# Patient Record
Sex: FEMALE | Race: WHITE | NOT HISPANIC OR LATINO | Employment: OTHER | URBAN - METROPOLITAN AREA
[De-identification: names, ages, dates, MRNs, and addresses within clinical notes are randomized per-mention and may not be internally consistent; named-entity substitution may affect disease eponyms.]

---

## 2017-10-18 NOTE — PATIENT DISCUSSION
this eye was NOT a toric IOL was a paired 30 degrees at 073.  residual cyl will likely need to be addressed to allow patient BCVA OS.  See [facility] in 2-3 weeks to consider YAG and possible LVC after healing finished (goal OS -0.50).   Pt ed [facility] will likely not want to LVC until at least 6 weeks PO.

## 2017-11-29 NOTE — PATIENT DISCUSSION
this eye was NOT a toric IOL was a paired 30 degrees at 073.  residual cyl will likely need to be addressed to allow patient BCVA OS.  See [facility] in 3-4 weeks to perform YAG and reasses for possible LVC after healing finished (goal OS -0.50).   Pt ed [facility] will likely not want to LVC until at least 6 weeks PO.

## 2017-11-29 NOTE — PROCEDURE NOTE: SURGICAL
Prior to commencing surgery patient identification, surgical procedure, site, and side were confirmed by Dr. Esteban Jarvis. Following topical proparacaine anesthesia, the patient was positioned at the YAG laser, a contact lens coupled to the cornea with methylcellulose and an axial posterior capsulotomy performed without complication using 2.9 Mj x 28. Excess methylcellulose was washed from the eye, one drop of Alphagan was instilled and the patient returned to the holding area having tolerated the procedure well and without complication. <br />

## 2017-12-07 NOTE — PROCEDURE NOTE: SURGICAL
Prior to commencing surgery patient identification, surgical procedure, site, and side were confirmed by Dr. Stephanie Torrez. Following topical proparacaine anesthesia, the patient was positioned at the YAG laser, a contact lens coupled to the cornea with methylcellulose and an axial posterior capsulotomy performed without complication using 3.1 Mj x 21. Excess methylcellulose was washed from the eye, one drop of Alphagan was instilled and the patient returned to the holding area having tolerated the procedure well and without complication. <br />

## 2017-12-14 NOTE — PATIENT DISCUSSION
OD excellent.  OS needs LVC to -0.50 goal.  full ed RBA to LVC enh OS.  To [facility] to decide i-LASIK vs epi OS.

## 2018-01-26 NOTE — PATIENT DISCUSSION
try Ilevro once a day for 2-3 weeks in LEFT eye to try and help with light sensitivity .  samples given.

## 2018-01-26 NOTE — PATIENT DISCUSSION
ed cease cross-covering eyes are exactly as supposed to be at this point.  Surgically everything looks perfect.

## 2018-02-07 NOTE — PATIENT DISCUSSION
this eye was NOT a toric IOL was a paired 30 degrees at 073.  residual cyl will likely need to be addressed to allow patient BCVA OS.  See [facility] in 3-4 weeks to perform YAG and reasses for possible LVC after healing finished (goal OS -0.50).   Pt ed [facility] will likely not want to LVC until at least 6 weeks PO. Statement Selected

## 2018-07-18 NOTE — PATIENT DISCUSSION
today I gave pt Rx for DVO to enhance BILATERAL DVA to max potential as he has numerous complaints about his DVA and I think he needs max correction with glare reduction OU to have a chance to see the way he wishes to see at distance (slight offset is bothering him and minor cyl OD is subjectively an issue even though entrance acuity OD is a weaker 20/20).

## 2018-07-18 NOTE — PATIENT DISCUSSION
will talk to [facility] to review data to see if we want to entertain the thought of LVC OD.  Pt feels DVA not as clear as he would like (street signs and seeing features on people's face at the gym etc).   Notes VA is variable thru the day so will try to keep eyes lubricated to see if this helps.  will order CTL for possible simulation for additional dist correction simulation OD.

## 2018-08-10 NOTE — PATIENT DISCUSSION
last visit I gave pt Rx for DVO to enhance BILATERAL DVA to max potential as he has numerous complaints about his DVA and I think he needs max correction with glare reduction OU to have a chance to see the way he wishes to see at distance (slight offset is bothering him and minor cyl OD is subjectively an issue even though entrance acuity OD is a weaker 20/20).

## 2018-08-10 NOTE — PATIENT DISCUSSION
will send for to talk to Yane Salgado to review data to see if we want to entertain the thought of LVC OD but Rx is very mild.  Pt feels DVA not as clear as he would like (street signs and seeing features on people's face at the gym etc).   Notes VA is variable thru the day so will try to keep eyes lubricated to see if this helps.

## 2018-08-10 NOTE — PATIENT DISCUSSION
"pt says his vision is ""not perfectly clear"" and pt says [facility] said he would have excellent vision after surgery.  pt refuses to wear glasses and feels his vision is decaying and he has friends who had this same surgery and they have no problems and can see excellent.  says cannot see more than 3 or 4 feet away with OS and feels that is not right (is 20/30 OS as expected).   I feel patient would like bilateral 20/20 DVA but I ed that will make NVA much worse.  Pt does NOT want this. "

## 2018-08-14 NOTE — PATIENT DISCUSSION
No Surgery recommended. [facility] recommends night driving glasses. The Night Driving glasses will be temporary and try not to wear them all the time.

## 2018-08-14 NOTE — PATIENT DISCUSSION
Pt can have night driving glasses. The Night Driving glasses will be temporary and try not to wear them all the time.

## 2022-01-19 ENCOUNTER — NEW PATIENT (OUTPATIENT)
Dept: URBAN - METROPOLITAN AREA CLINIC 26 | Facility: CLINIC | Age: 67
End: 2022-01-19

## 2022-01-19 VITALS
BODY MASS INDEX: 23.22 KG/M2 | SYSTOLIC BLOOD PRESSURE: 111 MMHG | WEIGHT: 136 LBS | HEART RATE: 68 BPM | DIASTOLIC BLOOD PRESSURE: 72 MMHG | HEIGHT: 64 IN

## 2022-01-19 DIAGNOSIS — H43.812: ICD-10-CM

## 2022-01-19 DIAGNOSIS — H04.123: ICD-10-CM

## 2022-01-19 DIAGNOSIS — H35.372: ICD-10-CM

## 2022-01-19 DIAGNOSIS — H25.13: ICD-10-CM

## 2022-01-19 PROCEDURE — 92004 COMPRE OPH EXAM NEW PT 1/>: CPT

## 2022-01-19 PROCEDURE — 92134 CPTRZ OPH DX IMG PST SGM RTA: CPT

## 2022-01-19 PROCEDURE — 92201 OPSCPY EXTND RTA DRAW UNI/BI: CPT

## 2022-01-19 ASSESSMENT — VISUAL ACUITY
OD_SC: 20/80+1
OD_CC: 20/25-2
OS_SC: 20/40+1
OS_CC: 20/25-2

## 2022-01-19 ASSESSMENT — TONOMETRY
OS_IOP_MMHG: 10
OD_IOP_MMHG: 11

## 2022-02-11 ENCOUNTER — FOLLOW UP (OUTPATIENT)
Dept: URBAN - METROPOLITAN AREA CLINIC 26 | Facility: CLINIC | Age: 67
End: 2022-02-11

## 2022-02-11 DIAGNOSIS — H25.13: ICD-10-CM

## 2022-02-11 DIAGNOSIS — H43.812: ICD-10-CM

## 2022-02-11 DIAGNOSIS — H04.123: ICD-10-CM

## 2022-02-11 DIAGNOSIS — H35.372: ICD-10-CM

## 2022-02-11 PROCEDURE — 92014 COMPRE OPH EXAM EST PT 1/>: CPT

## 2022-02-11 PROCEDURE — 92250 FUNDUS PHOTOGRAPHY W/I&R: CPT

## 2022-02-11 ASSESSMENT — TONOMETRY
OS_IOP_MMHG: 14
OD_IOP_MMHG: 12

## 2022-02-11 ASSESSMENT — VISUAL ACUITY
OD_CC: 20/25-2
OS_CC: 20/30+2

## 2022-07-30 ENCOUNTER — TELEPHONE ENCOUNTER (OUTPATIENT)
Age: 67
End: 2022-07-30

## 2022-07-31 ENCOUNTER — TELEPHONE ENCOUNTER (OUTPATIENT)
Age: 67
End: 2022-07-31